# Patient Record
Sex: FEMALE | Race: BLACK OR AFRICAN AMERICAN
[De-identification: names, ages, dates, MRNs, and addresses within clinical notes are randomized per-mention and may not be internally consistent; named-entity substitution may affect disease eponyms.]

---

## 2020-01-20 ENCOUNTER — HOSPITAL ENCOUNTER (EMERGENCY)
Dept: HOSPITAL 61 - ER | Age: 35
Discharge: HOME | End: 2020-01-20
Payer: MEDICAID

## 2020-01-20 VITALS — WEIGHT: 130.29 LBS | HEIGHT: 65 IN | BODY MASS INDEX: 21.71 KG/M2

## 2020-01-20 VITALS — SYSTOLIC BLOOD PRESSURE: 95 MMHG | DIASTOLIC BLOOD PRESSURE: 60 MMHG

## 2020-01-20 DIAGNOSIS — K04.7: Primary | ICD-10-CM

## 2020-01-20 DIAGNOSIS — K12.2: ICD-10-CM

## 2020-01-20 PROCEDURE — 99283 EMERGENCY DEPT VISIT LOW MDM: CPT

## 2020-01-20 PROCEDURE — 96372 THER/PROPH/DIAG INJ SC/IM: CPT

## 2020-01-20 RX ADMIN — LIDOCAINE HYDROCHLORIDE ONE ML: 20 INJECTION, SOLUTION INFILTRATION; PERINEURAL at 22:36

## 2020-01-20 NOTE — PHYS DOC
Past Medical History


Past Medical History:  No Pertinent History


Past Surgical History:  No Surgical History


Alcohol Use:  Occasionally





Adult General


Chief Complaint


Chief Complaint:  DENTAL PROBLEM





HPI


HPI





Patient is a 34  year old female who presents with dental pain that has been 

going on since Friday. The patient denies fevers and states she has an abscess 

to left upper part of mouth.





Complete ROS were reviewed and found to be within normal limits, except as 

documented in the HPI





Current Medications


Current Medications





Current Medications








 Medications


  (Trade)  Dose


 Ordered  Sig/Clarence  Start Time


 Stop Time Status Last Admin


Dose Admin


 


 Lidocaine HCl  20 ml  1X  ONCE  1/20/20 23:00


 1/20/20 23:01 DC 1/20/20 22:36


20 ML











Allergies


Allergies





Allergies








Coded Allergies Type Severity Reaction Last Updated Verified


 


  No Known Drug Allergies    1/20/20 No











Physical Exam


Physical Exam





Constitutional: Well developed, well nourished, no acute distress, non-toxic 

appearance. []


HENT: Normocephalic, atraumatic, bilateral external ears normal, oropharynx 

moist, no oral exudates, nose normal. Has a large abscess to left upper mouth. 


Eyes: PERRLA, EOMI, conjunctiva normal, no discharge. [] 


Back: No tenderness, no CVA tenderness. [] 


Extremities: No tenderness, no cyanosis, no clubbing, ROM intact, no edema. [] 


Neurologic: Alert and oriented X 3, normal motor function, normal sensory 

function, no focal deficits noted. []


Psychologic: Affect normal, judgement normal, mood normal. []





Current Patient Data


Vital Signs





                                   Vital Signs








  Date Time  Temp Pulse Resp B/P (MAP) Pulse Ox O2 Delivery O2 Flow Rate FiO2


 


1/20/20 21:57 98.5 95 17 95/60 (72) 96 Room Air  





 98.5       











EKG


EKG


[]





Radiology/Procedures


Radiology/Procedures


[]





Course & Med Decision Making


Course & Med Decision Making


Pertinent Labs and Imaging studies reviewed. (See chart for details)





Patient has dental abscess. Went into room to drain abscess and attempted 3 

times. Patient became upset the I asked her to lay flat to have abscess drained.

The first time I attempted drainage the patient grabbed my arm. Second time she 

moved her head and was opening and closing mouth. 3rd time she moved head away 

as well. I discussed with patient the importance of staying still while I 

attempted to numb her mouth and she became angry and said that she was not going

to allow me to drain abscess if she had to lay flat. When I reitterated to 

patient the importance of compliance with exam the patient stomped out of room.





I discussed with patient and gave the importance of the abscess being drained.





Dragon Disclaimer


Dragon Disclaimer


This electronic medical record was generated, in whole or in part, using a voice

recognition dictation system.





Departure


Departure


Impression:  


   Primary Impression:  


   Dental abscess


   Additional Impression:  


   Eloped from emergency department


Disposition:  01 HOME, SELF-CARE


Condition:  STABLE


Referrals:  


NO PCP (PCP)





Problem Qualifiers











NICHELLE HUANG          Jan 20, 2020 23:43